# Patient Record
Sex: FEMALE | Race: WHITE | NOT HISPANIC OR LATINO | Employment: FULL TIME | URBAN - METROPOLITAN AREA
[De-identification: names, ages, dates, MRNs, and addresses within clinical notes are randomized per-mention and may not be internally consistent; named-entity substitution may affect disease eponyms.]

---

## 2017-06-20 ENCOUNTER — TELEPHONE (OUTPATIENT)
Dept: ENDOCRINOLOGY | Facility: CLINIC | Age: 27
End: 2017-06-20

## 2017-06-20 NOTE — TELEPHONE ENCOUNTER
----- Message from Trina Davila sent at 6/20/2017  9:16 AM CDT -----  Patient has an appointment on 8/30/17. She would like to know if office needs anything from her previous doctor. Please call to advise at [phone].

## 2017-06-20 NOTE — TELEPHONE ENCOUNTER
Lm instructing pt to have any recent labs or u/s sent to the office prior to appt.  Fax number given.

## 2017-08-30 ENCOUNTER — OFFICE VISIT (OUTPATIENT)
Dept: ENDOCRINOLOGY | Facility: CLINIC | Age: 27
End: 2017-08-30
Payer: COMMERCIAL

## 2017-08-30 ENCOUNTER — HOSPITAL ENCOUNTER (OUTPATIENT)
Dept: RADIOLOGY | Facility: HOSPITAL | Age: 27
Discharge: HOME | End: 2017-08-30
Attending: INTERNAL MEDICINE
Payer: COMMERCIAL

## 2017-08-30 VITALS
SYSTOLIC BLOOD PRESSURE: 118 MMHG | HEIGHT: 70 IN | WEIGHT: 231.5 LBS | DIASTOLIC BLOOD PRESSURE: 72 MMHG | BODY MASS INDEX: 33.14 KG/M2 | HEART RATE: 76 BPM

## 2017-08-30 DIAGNOSIS — E01.0 THYROMEGALY: ICD-10-CM

## 2017-08-30 DIAGNOSIS — R94.6 ABNORMAL THYROID FUNCTION TEST: ICD-10-CM

## 2017-08-30 DIAGNOSIS — R53.83 FATIGUE, UNSPECIFIED TYPE: ICD-10-CM

## 2017-08-30 DIAGNOSIS — E01.0 THYROMEGALY: Primary | ICD-10-CM

## 2017-08-30 PROCEDURE — 99204 OFFICE O/P NEW MOD 45 MIN: CPT | Mod: S$GLB,,, | Performed by: INTERNAL MEDICINE

## 2017-08-30 PROCEDURE — 3008F BODY MASS INDEX DOCD: CPT | Mod: S$GLB,,, | Performed by: INTERNAL MEDICINE

## 2017-08-30 PROCEDURE — 76536 US EXAM OF HEAD AND NECK: CPT | Mod: TC,PO

## 2017-08-30 PROCEDURE — 99999 PR PBB SHADOW E&M-EST. PATIENT-LVL III: CPT | Mod: PBBFAC,,, | Performed by: INTERNAL MEDICINE

## 2017-08-30 PROCEDURE — 76536 US EXAM OF HEAD AND NECK: CPT | Mod: 26,,, | Performed by: RADIOLOGY

## 2017-08-30 RX ORDER — PHENTERMINE HYDROCHLORIDE 37.5 MG/1
37.5 TABLET ORAL
COMMUNITY

## 2017-08-30 RX ORDER — ESCITALOPRAM OXALATE 20 MG/1
20 TABLET ORAL DAILY
COMMUNITY

## 2017-08-30 RX ORDER — LEVOTHYROXINE SODIUM 25 UG/1
25 TABLET ORAL DAILY
Qty: 30 TABLET | Refills: 11 | Status: SHIPPED | OUTPATIENT
Start: 2017-08-30 | End: 2017-09-21

## 2017-08-30 NOTE — LETTER
August 30, 2017      Donny Hagen, REBEKAH  [address]  [facility] [place] [address]           [facility]  [address] [facility] [address] [place] [address]  Phone: [phone]  Fax: [phone]          Patient: Racheal Thurman   MR Number: [id number]   YOB: 1990   Date of Visit: 8/30/2017       Dear Donny Hagen:    Thank you for referring Racheal Thurman to me for evaluation. Attached you will find relevant portions of my assessment and plan of care.    If you have questions, please do not hesitate to call me. I look forward to following Racheal Thurman along with you.    Sincerely,    Tony Murphy MD    Enclosure  CC:  No Recipients    If you would like to receive this communication electronically, please contact [email or web address] or [phone] to request more information on [facility] access.    For providers and/or their staff who would like to refer a patient to [facility], please contact us through our one-stop-shop provider referral line, [facility], at [phone].    If you feel you have received this communication in error or would no longer like to receive these types of communications, please e-mail [email or web address]

## 2017-08-30 NOTE — PROGRESS NOTES
Subjective:      Patient ID: Racheal Thurman is a 27 y.o. female.    Chief Complaint:  Thyroid Problem      History of Present Illness    Ms.Brandi Thurman is referred to me for thyroid disease     No FH of thyroid disease     Weight loss program with NP since June 2017   She seen Donny Hagen NP as PCP, who did ' thyroid panel'     Weight loss of ~ 5 lbs since low carb diet   Since June 15 lbs weight loss       Review of Systems   Constitutional: Positive for unexpected weight change.   Respiratory: Negative for shortness of breath.    Cardiovascular: Positive for palpitations (occassional).   Gastrointestinal: Positive for constipation (both occassional) and diarrhea.   Endocrine: Positive for heat intolerance.   Genitourinary: Negative for menstrual problem.   Psychiatric/Behavioral: Negative for sleep disturbance.       Objective:   Physical Exam   Constitutional: She appears well-developed.   HENT:   Right Ear: External ear normal.   Left Ear: External ear normal.   Nose: Nose normal.   Neck: No tracheal deviation present. Thyromegaly present.   Cardiovascular: Normal rate.    No murmur heard.  Pulmonary/Chest: Effort normal and breath sounds normal.   Abdominal: Soft. There is no tenderness. No hernia.   Musculoskeletal: She exhibits no edema.   Neurological: She displays normal reflexes. No cranial nerve deficit.   Skin: No rash noted.          Psychiatric: She has a normal mood and affect. Judgment normal.   Vitals reviewed.      Lab Review:           Assessment:     1. Thyromegaly  US Soft Tissue Head Neck Thyroid    TSH    TSH    Vitamin D    TSH    Vitamin D    TSH    TSH    Vitamin D    TSH   2. Abnormal thyroid function test  US Soft Tissue Head Neck Thyroid    TSH    TSH    Vitamin D    TSH    Vitamin D    TSH    TSH    Vitamin D    TSH   3. Fatigue, unspecified type  US Soft Tissue Head Neck Thyroid    TSH    TSH    Vitamin D    TSH    Vitamin D    TSH    TSH    Vitamin D    TSH        # abnormal thyroid  function test   Goal TSH <2.5   Start levothyroxine 25 mcg daily   TSH in 2 months   RTC in 6 months       Plan:     Follow up:  TSH and vit D in 2 months and TSH in 6 months   RTC in 6 months

## 2017-09-21 ENCOUNTER — NURSE TRIAGE (OUTPATIENT)
Dept: ADMINISTRATIVE | Facility: CLINIC | Age: 27
End: 2017-09-21

## 2017-09-21 ENCOUNTER — TELEPHONE (OUTPATIENT)
Dept: ENDOCRINOLOGY | Facility: CLINIC | Age: 27
End: 2017-09-21

## 2017-09-21 DIAGNOSIS — E03.9 HYPOTHYROIDISM, UNSPECIFIED TYPE: Primary | ICD-10-CM

## 2017-09-21 NOTE — TELEPHONE ENCOUNTER
----- Message from Yasemin Rodrigez sent at 9/21/2017  8:21 AM CDT -----  Contact: self  [phone]  Murphy   /    Patient   Called stating that she having trouble with medication  levothyroxine (SYNTHROID   Call back number   [phone]  Thanks,

## 2017-09-21 NOTE — TELEPHONE ENCOUNTER
"  Reason for Disposition   Caller requesting a NON-URGENT new prescription or refill and triager unable to refill per unit policy    Answer Assessment - Initial Assessment Questions  1. SYMPTOMS: "Do you have any symptoms?"      fatigue  2. SEVERITY: If symptoms are present, ask "Are they mild, moderate or severe?"      Moderate    Ms. Thurman states she is having difficulty staying awake since she began taking Synthroid.    Protocols used: ST MEDICATION QUESTION CALL-A-AH    "

## 2017-09-21 NOTE — TELEPHONE ENCOUNTER
We received her TSH of 1.870 today. I called the patient back and told her her level was normal. She states she no longer takes Adipex, and has been on Lexapro for a year. She takes her inability to stay awake started with the Synthroid. Is there something else you can put her on?

## 2017-09-21 NOTE — TELEPHONE ENCOUNTER
Dr. Murphy the patient states she can not stay awake since you put her on Synthroid in August. She lives in [place] and is going to have a TSH drawn today. She will fax the lab results to Gaye when she gets the results for you to review.

## 2017-09-25 ENCOUNTER — TELEPHONE (OUTPATIENT)
Dept: ENDOCRINOLOGY | Facility: CLINIC | Age: 27
End: 2017-09-25

## 2017-09-25 NOTE — TELEPHONE ENCOUNTER
Returning patient's call- explains that, she was not returning a missed call from our office- it was for Korey- She will call to send message to Korey.

## 2017-09-25 NOTE — TELEPHONE ENCOUNTER
----- Message from Naina Stanton sent at 9/25/2017  9:00 AM CDT -----  Contact: self   Placed call to pod, patient miss call from your office please call back at [phone] ([place])

## 2017-09-25 NOTE — TELEPHONE ENCOUNTER
Lm instructing pt to call the office back to let us know she got our message to stop medication, external labs have been sent.

## 2018-02-21 ENCOUNTER — TELEPHONE (OUTPATIENT)
Dept: ENDOCRINOLOGY | Facility: CLINIC | Age: 28
End: 2018-02-21

## 2018-02-21 NOTE — TELEPHONE ENCOUNTER
----- Message from Melly Raiza sent at 2/21/2018  3:33 PM CST -----  Contact: self [phone]  She had to cancel her appt that was scheduled for tomorrow. [employer] would not allow me to reschedule the appt.  Please call her to reschedule. Thank you!